# Patient Record
Sex: MALE | Race: WHITE | NOT HISPANIC OR LATINO | URBAN - METROPOLITAN AREA
[De-identification: names, ages, dates, MRNs, and addresses within clinical notes are randomized per-mention and may not be internally consistent; named-entity substitution may affect disease eponyms.]

---

## 2023-07-13 LAB
ALANINE AMINOTRANSFERASE (SGPT) (U/L) IN SER/PLAS: 19 U/L (ref 10–52)
ALBUMIN (G/DL) IN SER/PLAS: 4.5 G/DL (ref 3.4–5)
ALKALINE PHOSPHATASE (U/L) IN SER/PLAS: 40 U/L (ref 33–136)
ANION GAP IN SER/PLAS: 16 MMOL/L (ref 10–20)
ASPARTATE AMINOTRANSFERASE (SGOT) (U/L) IN SER/PLAS: 32 U/L (ref 9–39)
BASOPHILS (10*3/UL) IN BLOOD BY AUTOMATED COUNT: 0.03 X10E9/L (ref 0–0.1)
BASOPHILS/100 LEUKOCYTES IN BLOOD BY AUTOMATED COUNT: 0.5 % (ref 0–2)
BILIRUBIN TOTAL (MG/DL) IN SER/PLAS: 0.5 MG/DL (ref 0–1.2)
CALCIUM (MG/DL) IN SER/PLAS: 9.4 MG/DL (ref 8.6–10.3)
CARBON DIOXIDE, TOTAL (MMOL/L) IN SER/PLAS: 22 MMOL/L (ref 21–32)
CHLORIDE (MMOL/L) IN SER/PLAS: 104 MMOL/L (ref 98–107)
CHOLESTEROL (MG/DL) IN SER/PLAS: 172 MG/DL (ref 0–199)
CHOLESTEROL IN HDL (MG/DL) IN SER/PLAS: 44.5 MG/DL
CHOLESTEROL/HDL RATIO: 3.9
CREATININE (MG/DL) IN SER/PLAS: 1.13 MG/DL (ref 0.5–1.3)
EOSINOPHILS (10*3/UL) IN BLOOD BY AUTOMATED COUNT: 0.41 X10E9/L (ref 0–0.7)
EOSINOPHILS/100 LEUKOCYTES IN BLOOD BY AUTOMATED COUNT: 6.8 % (ref 0–6)
ERYTHROCYTE DISTRIBUTION WIDTH (RATIO) BY AUTOMATED COUNT: 12.8 % (ref 11.5–14.5)
ERYTHROCYTE MEAN CORPUSCULAR HEMOGLOBIN CONCENTRATION (G/DL) BY AUTOMATED: 31 G/DL (ref 32–36)
ERYTHROCYTE MEAN CORPUSCULAR VOLUME (FL) BY AUTOMATED COUNT: 93 FL (ref 80–100)
ERYTHROCYTES (10*6/UL) IN BLOOD BY AUTOMATED COUNT: 5.13 X10E12/L (ref 4.5–5.9)
GFR MALE: 72 ML/MIN/1.73M2
GLUCOSE (MG/DL) IN SER/PLAS: 90 MG/DL (ref 74–99)
HEMATOCRIT (%) IN BLOOD BY AUTOMATED COUNT: 47.8 % (ref 41–52)
HEMOGLOBIN (G/DL) IN BLOOD: 14.8 G/DL (ref 13.5–17.5)
IMMATURE GRANULOCYTES/100 LEUKOCYTES IN BLOOD BY AUTOMATED COUNT: 0.2 % (ref 0–0.9)
LDL: 105 MG/DL (ref 0–99)
LEUKOCYTES (10*3/UL) IN BLOOD BY AUTOMATED COUNT: 6.1 X10E9/L (ref 4.4–11.3)
LYMPHOCYTES (10*3/UL) IN BLOOD BY AUTOMATED COUNT: 1.89 X10E9/L (ref 1.2–4.8)
LYMPHOCYTES/100 LEUKOCYTES IN BLOOD BY AUTOMATED COUNT: 31.2 % (ref 13–44)
MONOCYTES (10*3/UL) IN BLOOD BY AUTOMATED COUNT: 0.5 X10E9/L (ref 0.1–1)
MONOCYTES/100 LEUKOCYTES IN BLOOD BY AUTOMATED COUNT: 8.3 % (ref 2–10)
NEUTROPHILS (10*3/UL) IN BLOOD BY AUTOMATED COUNT: 3.21 X10E9/L (ref 1.2–7.7)
NEUTROPHILS/100 LEUKOCYTES IN BLOOD BY AUTOMATED COUNT: 53 % (ref 40–80)
PLATELETS (10*3/UL) IN BLOOD AUTOMATED COUNT: 284 X10E9/L (ref 150–450)
POTASSIUM (MMOL/L) IN SER/PLAS: 4.2 MMOL/L (ref 3.5–5.3)
PROSTATE SPECIFIC AG (NG/ML) IN SER/PLAS: 1.6 NG/ML (ref 0–4)
PROTEIN TOTAL: 7.8 G/DL (ref 6.4–8.2)
SODIUM (MMOL/L) IN SER/PLAS: 138 MMOL/L (ref 136–145)
THYROTROPIN (MIU/L) IN SER/PLAS BY DETECTION LIMIT <= 0.05 MIU/L: 2.71 MIU/L (ref 0.44–3.98)
TRIGLYCERIDE (MG/DL) IN SER/PLAS: 111 MG/DL (ref 0–149)
UREA NITROGEN (MG/DL) IN SER/PLAS: 20 MG/DL (ref 6–23)
VLDL: 22 MG/DL (ref 0–40)

## 2024-06-27 ENCOUNTER — LAB (OUTPATIENT)
Dept: LAB | Facility: LAB | Age: 64
End: 2024-06-27
Payer: MEDICARE

## 2024-06-27 DIAGNOSIS — Z12.5 ENCOUNTER FOR SCREENING FOR MALIGNANT NEOPLASM OF PROSTATE: Primary | ICD-10-CM

## 2024-06-27 DIAGNOSIS — I10 ESSENTIAL (PRIMARY) HYPERTENSION: ICD-10-CM

## 2024-06-27 DIAGNOSIS — E78.2 MIXED HYPERLIPIDEMIA: ICD-10-CM

## 2024-06-27 LAB
ALBUMIN SERPL BCP-MCNC: 4.3 G/DL (ref 3.4–5)
ALP SERPL-CCNC: 44 U/L (ref 33–136)
ALT SERPL W P-5'-P-CCNC: 14 U/L (ref 10–52)
ANION GAP SERPL CALC-SCNC: 10 MMOL/L (ref 10–20)
AST SERPL W P-5'-P-CCNC: 20 U/L (ref 9–39)
BASOPHILS # BLD AUTO: 0.01 X10*3/UL (ref 0–0.1)
BASOPHILS NFR BLD AUTO: 0.2 %
BILIRUB SERPL-MCNC: 0.6 MG/DL (ref 0–1.2)
BUN SERPL-MCNC: 20 MG/DL (ref 6–23)
CALCIUM SERPL-MCNC: 8.9 MG/DL (ref 8.6–10.3)
CHLORIDE SERPL-SCNC: 105 MMOL/L (ref 98–107)
CHOLEST SERPL-MCNC: 162 MG/DL (ref 0–199)
CHOLESTEROL/HDL RATIO: 3.9
CO2 SERPL-SCNC: 28 MMOL/L (ref 21–32)
CREAT SERPL-MCNC: 1.12 MG/DL (ref 0.5–1.3)
EGFRCR SERPLBLD CKD-EPI 2021: 73 ML/MIN/1.73M*2
EOSINOPHIL # BLD AUTO: 0.18 X10*3/UL (ref 0–0.7)
EOSINOPHIL NFR BLD AUTO: 3.4 %
ERYTHROCYTE [DISTWIDTH] IN BLOOD BY AUTOMATED COUNT: 13.2 % (ref 11.5–14.5)
GLUCOSE SERPL-MCNC: 98 MG/DL (ref 74–99)
HCT VFR BLD AUTO: 44.6 % (ref 41–52)
HDLC SERPL-MCNC: 41.4 MG/DL
HGB BLD-MCNC: 14.1 G/DL (ref 13.5–17.5)
IMM GRANULOCYTES # BLD AUTO: 0.02 X10*3/UL (ref 0–0.7)
IMM GRANULOCYTES NFR BLD AUTO: 0.4 % (ref 0–0.9)
LDLC SERPL CALC-MCNC: 99 MG/DL
LYMPHOCYTES # BLD AUTO: 1.58 X10*3/UL (ref 1.2–4.8)
LYMPHOCYTES NFR BLD AUTO: 30.3 %
MCH RBC QN AUTO: 28.3 PG (ref 26–34)
MCHC RBC AUTO-ENTMCNC: 31.6 G/DL (ref 32–36)
MCV RBC AUTO: 89 FL (ref 80–100)
MONOCYTES # BLD AUTO: 0.62 X10*3/UL (ref 0.1–1)
MONOCYTES NFR BLD AUTO: 11.9 %
NEUTROPHILS # BLD AUTO: 2.81 X10*3/UL (ref 1.2–7.7)
NEUTROPHILS NFR BLD AUTO: 53.8 %
NON HDL CHOLESTEROL: 121 MG/DL (ref 0–149)
NRBC BLD-RTO: 0 /100 WBCS (ref 0–0)
PLATELET # BLD AUTO: 224 X10*3/UL (ref 150–450)
POTASSIUM SERPL-SCNC: 4.3 MMOL/L (ref 3.5–5.3)
PROT SERPL-MCNC: 7.1 G/DL (ref 6.4–8.2)
PSA SERPL-MCNC: 1.43 NG/ML
RBC # BLD AUTO: 4.99 X10*6/UL (ref 4.5–5.9)
SODIUM SERPL-SCNC: 139 MMOL/L (ref 136–145)
TRIGL SERPL-MCNC: 108 MG/DL (ref 0–149)
TSH SERPL-ACNC: 1.93 MIU/L (ref 0.44–3.98)
VLDL: 22 MG/DL (ref 0–40)
WBC # BLD AUTO: 5.2 X10*3/UL (ref 4.4–11.3)

## 2024-06-27 PROCEDURE — G0103 PSA SCREENING: HCPCS

## 2024-06-27 PROCEDURE — 80053 COMPREHEN METABOLIC PANEL: CPT

## 2024-06-27 PROCEDURE — 80061 LIPID PANEL: CPT

## 2024-06-27 PROCEDURE — 85025 COMPLETE CBC W/AUTO DIFF WBC: CPT

## 2024-06-27 PROCEDURE — 84443 ASSAY THYROID STIM HORMONE: CPT

## 2024-12-27 ENCOUNTER — APPOINTMENT (OUTPATIENT)
Dept: GENERAL RADIOLOGY | Age: 64
DRG: 310 | End: 2024-12-27
Payer: MEDICARE

## 2024-12-27 ENCOUNTER — HOSPITAL ENCOUNTER (INPATIENT)
Age: 64
LOS: 1 days | Discharge: HOME | DRG: 310 | End: 2024-12-28
Attending: STUDENT IN AN ORGANIZED HEALTH CARE EDUCATION/TRAINING PROGRAM | Admitting: INTERNAL MEDICINE
Payer: MEDICARE

## 2024-12-27 DIAGNOSIS — I48.91 ATRIAL FIBRILLATION WITH RVR (HCC): Primary | ICD-10-CM

## 2024-12-27 DIAGNOSIS — I48.19 PERSISTENT ATRIAL FIBRILLATION (HCC): ICD-10-CM

## 2024-12-27 LAB
EKG ATRIAL RATE: 147 BPM
EKG Q-T INTERVAL: 322 MS
EKG QRS DURATION: 102 MS
EKG QTC CALCULATION (BAZETT): 508 MS
EKG R AXIS: -41 DEGREES
EKG T AXIS: 6 DEGREES
EKG VENTRICULAR RATE: 150 BPM

## 2024-12-27 PROCEDURE — 96365 THER/PROPH/DIAG IV INF INIT: CPT

## 2024-12-27 PROCEDURE — 71045 X-RAY EXAM CHEST 1 VIEW: CPT

## 2024-12-27 PROCEDURE — 99285 EMERGENCY DEPT VISIT HI MDM: CPT

## 2024-12-27 PROCEDURE — 93005 ELECTROCARDIOGRAM TRACING: CPT

## 2024-12-27 RX ORDER — DILTIAZEM HYDROCHLORIDE 5 MG/ML
20 INJECTION INTRAVENOUS ONCE
Status: DISCONTINUED | OUTPATIENT
Start: 2024-12-27 | End: 2024-12-28

## 2024-12-27 ASSESSMENT — PAIN SCALES - GENERAL: PAINLEVEL_OUTOF10: 0

## 2024-12-27 ASSESSMENT — PAIN - FUNCTIONAL ASSESSMENT: PAIN_FUNCTIONAL_ASSESSMENT: 0-10

## 2024-12-28 ENCOUNTER — APPOINTMENT (OUTPATIENT)
Age: 64
DRG: 310 | End: 2024-12-28
Attending: INTERNAL MEDICINE
Payer: MEDICARE

## 2024-12-28 VITALS
OXYGEN SATURATION: 100 % | HEIGHT: 72 IN | SYSTOLIC BLOOD PRESSURE: 101 MMHG | HEART RATE: 91 BPM | DIASTOLIC BLOOD PRESSURE: 70 MMHG | BODY MASS INDEX: 30.34 KG/M2 | RESPIRATION RATE: 18 BRPM | TEMPERATURE: 97.7 F | WEIGHT: 224 LBS

## 2024-12-28 PROBLEM — I48.91 ATRIAL FIBRILLATION WITH RVR (HCC): Status: ACTIVE | Noted: 2024-12-28

## 2024-12-28 LAB
ANION GAP SERPL CALCULATED.3IONS-SCNC: 15 MEQ/L (ref 9–15)
BASOPHILS # BLD: 0 K/UL (ref 0–0.2)
BASOPHILS NFR BLD: 0.4 %
BNP BLD-MCNC: 116 PG/ML
BUN SERPL-MCNC: 17 MG/DL (ref 8–23)
CALCIUM SERPL-MCNC: 9.5 MG/DL (ref 8.5–9.9)
CHLORIDE SERPL-SCNC: 101 MEQ/L (ref 95–107)
CO2 SERPL-SCNC: 22 MEQ/L (ref 20–31)
CREAT SERPL-MCNC: 1.04 MG/DL (ref 0.7–1.2)
EKG ATRIAL RATE: 340 BPM
EKG Q-T INTERVAL: 388 MS
EKG QRS DURATION: 98 MS
EKG QTC CALCULATION (BAZETT): 442 MS
EKG R AXIS: -6 DEGREES
EKG T AXIS: 22 DEGREES
EKG VENTRICULAR RATE: 78 BPM
EOSINOPHIL # BLD: 0.2 K/UL (ref 0–0.7)
EOSINOPHIL NFR BLD: 2.2 %
ERYTHROCYTE [DISTWIDTH] IN BLOOD BY AUTOMATED COUNT: 12.8 % (ref 11.5–14.5)
GLUCOSE SERPL-MCNC: 154 MG/DL (ref 70–99)
HCT VFR BLD AUTO: 46.2 % (ref 42–52)
HGB BLD-MCNC: 15.5 G/DL (ref 14–18)
LYMPHOCYTES # BLD: 3.4 K/UL (ref 1–4.8)
LYMPHOCYTES NFR BLD: 34 %
MCH RBC QN AUTO: 29.1 PG (ref 27–31.3)
MCHC RBC AUTO-ENTMCNC: 33.5 % (ref 33–37)
MCV RBC AUTO: 86.7 FL (ref 79–92.2)
MONOCYTES # BLD: 0.9 K/UL (ref 0.2–0.8)
MONOCYTES NFR BLD: 8.5 %
NEUTROPHILS # BLD: 5.5 K/UL (ref 1.4–6.5)
NEUTS SEG NFR BLD: 54.6 %
PLATELET # BLD AUTO: 373 K/UL (ref 130–400)
POTASSIUM SERPL-SCNC: 3.6 MEQ/L (ref 3.4–4.9)
RBC # BLD AUTO: 5.33 M/UL (ref 4.7–6.1)
SODIUM SERPL-SCNC: 138 MEQ/L (ref 135–144)
T4 FREE SERPL-MCNC: 1.15 NG/DL (ref 0.84–1.68)
TROPONIN, HIGH SENSITIVITY: 10 NG/L (ref 0–19)
TROPONIN, HIGH SENSITIVITY: 14 NG/L (ref 0–19)
TSH SERPL-MCNC: 2.84 UIU/ML (ref 0.44–3.86)
WBC # BLD AUTO: 10.1 K/UL (ref 4.8–10.8)

## 2024-12-28 PROCEDURE — 85025 COMPLETE CBC W/AUTO DIFF WBC: CPT

## 2024-12-28 PROCEDURE — 99235 HOSP IP/OBS SAME DATE MOD 70: CPT | Performed by: INTERNAL MEDICINE

## 2024-12-28 PROCEDURE — 2500000003 HC RX 250 WO HCPCS: Performed by: STUDENT IN AN ORGANIZED HEALTH CARE EDUCATION/TRAINING PROGRAM

## 2024-12-28 PROCEDURE — 96365 THER/PROPH/DIAG IV INF INIT: CPT

## 2024-12-28 PROCEDURE — 84443 ASSAY THYROID STIM HORMONE: CPT

## 2024-12-28 PROCEDURE — 84484 ASSAY OF TROPONIN QUANT: CPT

## 2024-12-28 PROCEDURE — 84439 ASSAY OF FREE THYROXINE: CPT

## 2024-12-28 PROCEDURE — 2060000000 HC ICU INTERMEDIATE R&B

## 2024-12-28 PROCEDURE — 6370000000 HC RX 637 (ALT 250 FOR IP): Performed by: INTERNAL MEDICINE

## 2024-12-28 PROCEDURE — C8929 TTE W OR WO FOL WCON,DOPPLER: HCPCS

## 2024-12-28 PROCEDURE — 83880 ASSAY OF NATRIURETIC PEPTIDE: CPT

## 2024-12-28 PROCEDURE — 2580000003 HC RX 258: Performed by: STUDENT IN AN ORGANIZED HEALTH CARE EDUCATION/TRAINING PROGRAM

## 2024-12-28 PROCEDURE — 96375 TX/PRO/DX INJ NEW DRUG ADDON: CPT

## 2024-12-28 PROCEDURE — 80048 BASIC METABOLIC PNL TOTAL CA: CPT

## 2024-12-28 PROCEDURE — 6370000000 HC RX 637 (ALT 250 FOR IP): Performed by: STUDENT IN AN ORGANIZED HEALTH CARE EDUCATION/TRAINING PROGRAM

## 2024-12-28 PROCEDURE — 93005 ELECTROCARDIOGRAM TRACING: CPT

## 2024-12-28 RX ORDER — METOPROLOL TARTRATE 25 MG/1
25 TABLET, FILM COATED ORAL 2 TIMES DAILY
Status: DISCONTINUED | OUTPATIENT
Start: 2024-12-28 | End: 2024-12-28 | Stop reason: HOSPADM

## 2024-12-28 RX ORDER — METOPROLOL SUCCINATE 25 MG/1
25 TABLET, EXTENDED RELEASE ORAL DAILY
Status: DISCONTINUED | OUTPATIENT
Start: 2024-12-28 | End: 2024-12-28

## 2024-12-28 RX ORDER — SODIUM CHLORIDE 9 MG/ML
INJECTION, SOLUTION INTRAVENOUS PRN
Status: DISCONTINUED | OUTPATIENT
Start: 2024-12-28 | End: 2024-12-28 | Stop reason: HOSPADM

## 2024-12-28 RX ORDER — ONDANSETRON 4 MG/1
4 TABLET, ORALLY DISINTEGRATING ORAL EVERY 8 HOURS PRN
Status: DISCONTINUED | OUTPATIENT
Start: 2024-12-28 | End: 2024-12-28 | Stop reason: HOSPADM

## 2024-12-28 RX ORDER — ENOXAPARIN SODIUM 100 MG/ML
40 INJECTION SUBCUTANEOUS DAILY
Status: DISCONTINUED | OUTPATIENT
Start: 2024-12-28 | End: 2024-12-28

## 2024-12-28 RX ORDER — SODIUM CHLORIDE 0.9 % (FLUSH) 0.9 %
5-40 SYRINGE (ML) INJECTION PRN
Status: DISCONTINUED | OUTPATIENT
Start: 2024-12-28 | End: 2024-12-28 | Stop reason: HOSPADM

## 2024-12-28 RX ORDER — ACETAMINOPHEN 325 MG/1
650 TABLET ORAL EVERY 4 HOURS PRN
Status: DISCONTINUED | OUTPATIENT
Start: 2024-12-28 | End: 2024-12-28 | Stop reason: HOSPADM

## 2024-12-28 RX ORDER — FAMOTIDINE 20 MG/1
20 TABLET, FILM COATED ORAL ONCE
Status: COMPLETED | OUTPATIENT
Start: 2024-12-28 | End: 2024-12-28

## 2024-12-28 RX ORDER — SODIUM CHLORIDE 0.9 % (FLUSH) 0.9 %
5-40 SYRINGE (ML) INJECTION EVERY 12 HOURS SCHEDULED
Status: DISCONTINUED | OUTPATIENT
Start: 2024-12-28 | End: 2024-12-28 | Stop reason: HOSPADM

## 2024-12-28 RX ORDER — METOPROLOL TARTRATE 25 MG/1
25 TABLET, FILM COATED ORAL 2 TIMES DAILY
Qty: 60 TABLET | Refills: 3 | Status: SHIPPED | OUTPATIENT
Start: 2024-12-28 | End: 2024-12-28

## 2024-12-28 RX ORDER — METOPROLOL TARTRATE 1 MG/ML
5 INJECTION, SOLUTION INTRAVENOUS EVERY 5 MIN PRN
Status: COMPLETED | OUTPATIENT
Start: 2024-12-28 | End: 2024-12-28

## 2024-12-28 RX ORDER — ESOMEPRAZOLE MAGNESIUM 20 MG/1
20 GRANULE, DELAYED RELEASE ORAL PRN
COMMUNITY

## 2024-12-28 RX ORDER — ONDANSETRON 2 MG/ML
4 INJECTION INTRAMUSCULAR; INTRAVENOUS EVERY 6 HOURS PRN
Status: DISCONTINUED | OUTPATIENT
Start: 2024-12-28 | End: 2024-12-28 | Stop reason: HOSPADM

## 2024-12-28 RX ORDER — METOPROLOL TARTRATE 25 MG/1
25 TABLET, FILM COATED ORAL 2 TIMES DAILY
Qty: 60 TABLET | Refills: 3 | Status: SHIPPED | OUTPATIENT
Start: 2024-12-28

## 2024-12-28 RX ORDER — DILTIAZEM HYDROCHLORIDE 5 MG/ML
20 INJECTION INTRAVENOUS ONCE
Status: COMPLETED | OUTPATIENT
Start: 2024-12-28 | End: 2024-12-28

## 2024-12-28 RX ADMIN — METOPROLOL TARTRATE 25 MG: 25 TABLET, FILM COATED ORAL at 11:28

## 2024-12-28 RX ADMIN — METOPROLOL TARTRATE 5 MG: 5 INJECTION INTRAVENOUS at 00:09

## 2024-12-28 RX ADMIN — METOPROLOL TARTRATE 5 MG: 5 INJECTION INTRAVENOUS at 00:04

## 2024-12-28 RX ADMIN — SODIUM CHLORIDE 5 MG/HR: 900 INJECTION, SOLUTION INTRAVENOUS at 01:11

## 2024-12-28 RX ADMIN — DILTIAZEM HYDROCHLORIDE 20 MG: 5 INJECTION, SOLUTION INTRAVENOUS at 00:58

## 2024-12-28 RX ADMIN — FAMOTIDINE 20 MG: 20 TABLET, FILM COATED ORAL at 00:58

## 2024-12-28 RX ADMIN — METOPROLOL TARTRATE 5 MG: 5 INJECTION INTRAVENOUS at 00:14

## 2024-12-28 ASSESSMENT — LIFESTYLE VARIABLES
HOW OFTEN DO YOU HAVE A DRINK CONTAINING ALCOHOL: NEVER
HOW OFTEN DO YOU HAVE A DRINK CONTAINING ALCOHOL: NEVER
HOW MANY STANDARD DRINKS CONTAINING ALCOHOL DO YOU HAVE ON A TYPICAL DAY: PATIENT DOES NOT DRINK
HOW MANY STANDARD DRINKS CONTAINING ALCOHOL DO YOU HAVE ON A TYPICAL DAY: PATIENT DOES NOT DRINK

## 2024-12-28 ASSESSMENT — ENCOUNTER SYMPTOMS
VOMITING: 0
GASTROINTESTINAL NEGATIVE: 1
EYES NEGATIVE: 1
ABDOMINAL PAIN: 0
NAUSEA: 0
ALLERGIC/IMMUNOLOGIC NEGATIVE: 1
WHEEZING: 0
SHORTNESS OF BREATH: 0

## 2024-12-28 ASSESSMENT — PAIN SCALES - GENERAL: PAINLEVEL_OUTOF10: 0

## 2024-12-28 NOTE — ED TRIAGE NOTES
Pt arived with co AFIB.   Pt states he took 12.5 mg lopressor and xanax before coming because he was nervous.

## 2024-12-28 NOTE — ED PROVIDER NOTES
[facility] ED  Emergency Department Encounter  Emergency Medicine      Pt Name: Dustin Yee  MRN:[id number]  Birthdate 1960  Date of evaluation: 12/27/24  Time: 11:50 PM EST  PCP:  Unknown, Unknown (Inactive)    CHIEF COMPLAINT       Chief Complaint   Patient presents with    Tachycardia       HISTORY OF PRESENT ILLNESS  (Location/Symptom, Timing/Onset, Context/Setting, Quality, Duration, ModifyingFactors, Severity.)      Dustin Yee is a 64 y.o. male with PMH of hypertension, anxiety, paroxysmal A-fib presents with tachycardia.  Said he ate dinner and felt like it started to come on a few hours ago, had some left-sided chest pain and palpitations.  Denies lightheadedness no current chest pain not short of breath no leg swelling.  He is on any blood thinners.  He said he had 2 other episodes of A-fib when he was 34 and 49 years old.  He did have to get admitted at 1 point but converted after getting on medications.  He did take his Lopressor 12.5 mg this morning and this afternoon.  He recently had upper respiratory symptoms of congestion and cough but has been getting better over the last several days.  Denies any hemoptysis no nausea vomiting no diarrhea no urinary complaints no drugs or alcohol    PAST MEDICAL / SURGICAL / SOCIAL /FAMILY HISTORY      has no past medical history on file.  No other pertinent PMH on review with patient/guardian.     has no past surgical history on file.  No other pertinent PSH on review with patient/guardian.  Social History     Socioeconomic History    Marital status:      Spouse name: Not on file    Number of children: Not on file    Years of education: Not on file    Highest education level: Not on file   Occupational History    Not on file   Tobacco Use    Smoking status: Never    Smokeless tobacco: Never   Substance and Sexual Activity    Alcohol use: Never    Drug use: Never    Sexual activity: Not on file   Other Topics Concern    Not on file

## 2024-12-28 NOTE — PLAN OF CARE
Problem: Discharge Planning  Goal: Discharge to home or other facility with appropriate resources  Outcome: Progressing  Flowsheets  Taken 12/28/2024 0324  Discharge to home or other facility with appropriate resources:   Identify barriers to discharge with patient and caregiver   Identify discharge learning needs (meds, wound care, etc)   Arrange for interpreters to assist at discharge as needed   Refer to discharge planning if patient needs post-hospital services based on physician order or complex needs related to functional status, cognitive ability or social support system   Arrange for needed discharge resources and transportation as appropriate  Taken 12/28/2024 0323  Discharge to home or other facility with appropriate resources:   Identify barriers to discharge with patient and caregiver   Identify discharge learning needs (meds, wound care, etc)   Refer to discharge planning if patient needs post-hospital services based on physician order or complex needs related to functional status, cognitive ability or social support system   Arrange for interpreters to assist at discharge as needed   Arrange for needed discharge resources and transportation as appropriate     Problem: Pain  Goal: Verbalizes/displays adequate comfort level or baseline comfort level  Outcome: Progressing

## 2024-12-28 NOTE — H&P
Transportation (Medical): No     Lack of Transportation (Non-Medical): No   Physical Activity: Sufficiently Active (7/1/2024)    Received from [facility]    Exercise Vital Sign     Days of Exercise per Week: 4 days     Minutes of Exercise per Session: 100 min   Stress: No Stress Concern Present (7/1/2024)    Received from [facility]    [employer] [place] [employer] - Occupational Stress Questionnaire     Feeling of Stress : Not at all   Housing Stability: Low Risk  (12/28/2024)    Housing Stability Vital Sign     Unable to Pay for Housing in the Last Year: No     Number of Times Moved in the Last Year: 1     Homeless in the Last Year: No       History reviewed. No pertinent family history.    Current Facility-Administered Medications   Medication Dose Route Frequency Provider Last Rate Last Admin    dilTIAZem 100 mg in sodium chloride 0.9 % 100 mL infusion ([facility])  2.5-15 mg/hr IntraVENous Continuous Agustín Strong DO 2.5 mL/hr at 12/28/24 0914 2.5 mg/hr at 12/28/24 0914    sodium chloride flush 0.9 % injection 5-40 mL  5-40 mL IntraVENous 2 times per day Agustín Strong DO        sodium chloride flush 0.9 % injection 5-40 mL  5-40 mL IntraVENous PRN Agustín Strong DO        0.9 % sodium chloride infusion   IntraVENous PRN Agustín Strong DO        acetaminophen (TYLENOL) tablet 650 mg  650 mg Oral Q4H PRN Agustín Strong DO        ondansetron (ZOFRAN-ODT) disintegrating tablet 4 mg  4 mg Oral Q8H PRN Agustín Strong DO        Or    ondansetron (ZOFRAN) injection 4 mg  4 mg IntraVENous Q6H PRN Agustín Strong,         enoxaparin (LOVENOX) injection 40 mg  40 mg SubCUTAneous Daily Agustín Strong DO           ALLERGIES: Caffeine and Codeine    Review of Systems   Constitutional: Negative.  Negative for chills and fever.   HENT: Negative.     Eyes: Negative.    Respiratory:  Negative for shortness of breath and wheezing.    Cardiovascular:  Positive for palpitations. Negative for chest  awaiting discharge, no change nerve deficit.   Psychiatric:         Behavior: Behavior normal.         Thought Content: Thought content normal.         Judgment: Judgment normal.         LABS:    Pertinent Labs:  CBC:   Recent Labs     12/28/24 0008   WBC 10.1   HGB 15.5        BMP:  Recent Labs     12/28/24 0008      K 3.6      CO2 22   BUN 17   CREATININE 1.04   GLUCOSE 154*   LABGLOM 79.1     INR: No results for input(s): \"INR\" in the last 72 hours.  BNP: No results for input(s): \"BNP\" in the last 72 hours.   TSH:   Lab Results   Component Value Date    TSH 2.840 12/28/2024      Cardiac Injury Profile: No results for input(s): \"CKTOTAL\", \"CKMB\", \"CKMBINDEX\", \"TROPONINI\" in the last 72 hours.   Troponin:   Lab Results   Component Value Date/Time    TROPONINI 0.031 09/19/2011 05:55 AM     Lipid Profile:   Lab Results   Component Value Date/Time    TRIG 228 09/18/2011 06:05 PM    HDL 41 09/18/2011 06:05 PM    CHOL 197 09/18/2011 06:05 PM      Hemoglobin A1C: No components found for: \"HGBA1C\"       Radiology:  XR CHEST PORTABLE    Result Date: 12/28/2024  EXAMINATION: ONE XRAY VIEW OF THE CHEST 12/28/2024 12:09 am COMPARISON: 9-2011 HISTORY: ORDERING SYSTEM PROVIDED HISTORY: tachycardia TECHNOLOGIST PROVIDED HISTORY: Reason for exam:->tachycardia What reading provider will be dictating this exam?->CRC FINDINGS: Cardiomediastinal silhouette: No cardiomegaly or obvious acute process is identified. Lungs/pleura: No acute pulmonary infiltrate, pleural effusion, or pneumothorax is identified. Other: No additional acute abnormality.     No acute cardiopulmonary process is identified by portable chest x-ray.       EKG: atrial fibrillation      2D Echo:     No results found for this or any previous visit.      Stress Test:     No results found for this or any previous visit.  No results found for this or any previous visit from the past 365 days.       No results found for this or any previous visit from the past 365

## 2024-12-29 LAB
ECHO AO ROOT DIAM: 3.7 CM
ECHO AO ROOT INDEX: 1.64 CM/M2
ECHO AV AREA PEAK VELOCITY: 2.9 CM2
ECHO AV AREA VTI: 3.2 CM2
ECHO AV AREA/BSA PEAK VELOCITY: 1.3 CM2/M2
ECHO AV AREA/BSA VTI: 1.4 CM2/M2
ECHO AV MEAN GRADIENT: 5 MMHG
ECHO AV MEAN VELOCITY: 1 M/S
ECHO AV PEAK GRADIENT: 9 MMHG
ECHO AV PEAK VELOCITY: 1.7 M/S
ECHO AV VELOCITY RATIO: 0.71
ECHO AV VTI: 28 CM
ECHO BSA: 2.28 M2
ECHO EST RA PRESSURE: 3 MMHG
ECHO LA DIAMETER INDEX: 2.13 CM/M2
ECHO LA DIAMETER: 4.8 CM
ECHO LA TO AORTIC ROOT RATIO: 1.3
ECHO LA VOL A-L A4C: 65 ML (ref 18–58)
ECHO LA VOL MOD A4C: 61 ML (ref 18–58)
ECHO LA VOLUME INDEX A-L A4C: 29 ML/M2 (ref 16–34)
ECHO LA VOLUME INDEX MOD A4C: 27 ML/M2 (ref 16–34)
ECHO LV E' LATERAL VELOCITY: 15.7 CM/S
ECHO LV E' SEPTAL VELOCITY: 10.09 CM/S
ECHO LV EDV A4C: 53 ML
ECHO LV EDV INDEX A4C: 24 ML/M2
ECHO LV EF PHYSICIAN: 60 %
ECHO LV EJECTION FRACTION A4C: 77 %
ECHO LV ESV A4C: 12 ML
ECHO LV ESV INDEX A4C: 5 ML/M2
ECHO LV FRACTIONAL SHORTENING: 29 % (ref 28–44)
ECHO LV INTERNAL DIMENSION DIASTOLE INDEX: 2.13 CM/M2
ECHO LV INTERNAL DIMENSION DIASTOLIC: 4.8 CM (ref 4.2–5.9)
ECHO LV INTERNAL DIMENSION SYSTOLIC INDEX: 1.51 CM/M2
ECHO LV INTERNAL DIMENSION SYSTOLIC: 3.4 CM
ECHO LV IVSD: 1.2 CM (ref 0.6–1)
ECHO LV IVSS: 1.6 CM
ECHO LV MASS 2D: 232.2 G (ref 88–224)
ECHO LV MASS INDEX 2D: 103.2 G/M2 (ref 49–115)
ECHO LV POSTERIOR WALL DIASTOLIC: 1.3 CM (ref 0.6–1)
ECHO LV POSTERIOR WALL SYSTOLIC: 1.8 CM
ECHO LV RELATIVE WALL THICKNESS RATIO: 0.54
ECHO LVOT AREA: 3.8 CM2
ECHO LVOT AV VTI INDEX: 0.83
ECHO LVOT DIAM: 2.2 CM
ECHO LVOT MEAN GRADIENT: 3 MMHG
ECHO LVOT PEAK GRADIENT: 6 MMHG
ECHO LVOT PEAK VELOCITY: 1.2 M/S
ECHO LVOT STROKE VOLUME INDEX: 39 ML/M2
ECHO LVOT SV: 87.8 ML
ECHO LVOT VTI: 23.1 CM
ECHO MV A VELOCITY: 0.34 M/S
ECHO MV E DECELERATION TIME (DT): 201.5 MS
ECHO MV E VELOCITY: 0.81 M/S
ECHO MV E/A RATIO: 2.38
ECHO MV E/E' LATERAL: 5.16
ECHO MV E/E' RATIO (AVERAGED): 6.59
ECHO MV E/E' SEPTAL: 8.03
ECHO PV MAX VELOCITY: 1 M/S
ECHO PV PEAK GRADIENT: 4 MMHG
ECHO RIGHT VENTRICULAR SYSTOLIC PRESSURE (RVSP): 9 MMHG
ECHO RV INTERNAL DIMENSION: 3.1 CM
ECHO RV TAPSE: 2 CM (ref 1.7–?)
ECHO TV REGURGITANT MAX VELOCITY: 1.19 M/S
ECHO TV REGURGITANT PEAK GRADIENT: 6 MMHG

## 2024-12-29 PROCEDURE — 93306 TTE W/DOPPLER COMPLETE: CPT | Performed by: INTERNAL MEDICINE

## 2025-01-02 LAB
EKG ATRIAL RATE: 147 BPM
EKG ATRIAL RATE: 340 BPM
EKG Q-T INTERVAL: 322 MS
EKG Q-T INTERVAL: 388 MS
EKG QRS DURATION: 102 MS
EKG QRS DURATION: 98 MS
EKG QTC CALCULATION (BAZETT): 442 MS
EKG QTC CALCULATION (BAZETT): 508 MS
EKG R AXIS: -41 DEGREES
EKG R AXIS: -6 DEGREES
EKG T AXIS: 22 DEGREES
EKG T AXIS: 6 DEGREES
EKG VENTRICULAR RATE: 150 BPM
EKG VENTRICULAR RATE: 78 BPM

## 2025-06-03 PROBLEM — R73.03 PREDIABETES: Status: ACTIVE | Noted: 2025-06-03

## 2025-06-03 NOTE — PROGRESS NOTES
Nutrition Initial Assessment:     Patient Dm Marr is a 64 y.o. male being seen at [facility] who was referred by CHRIS De on 5/21/25 for   1. Prediabetes  Referral to Diabetes Education        Nutrition Assessment    Problem List[1]    Nutrition History:  Food & Nutrition History: Patient reports his A1C has been 5.7-5.9% for a few years, A1C of 5.8% is in his chart 9/2021. Recently his A1C was 6.5%. He was on a higher dose of metoprolol and has considered that this might have led to an increase in A1C - the dose of metoprolol is now back to a lower level. When it was high he was less physically active because he was experiencing dizziness. He has cut back on sugar intake since learning of elevated A1C. His wife Bouchra came to the visit as well, they are interested in learning strategies for promoting glycemic control. He has been targeting 20-40 g CHO/meal and 15-20 g CHO/snack. He has a glucometer at home, has checked 5 times fasting in the past ~1 month and values have been  mg/dL. Has been considering taking GlycoShield since he heard it could help.  Food Allergies:  (none)    Diet Recall:  Meal 1: B: 8, cream of wheat w/ bluberries and cinnamon, sometimes w/ 1 slice of avocado toast and a tangerine. Or sugar-free plain yogurt w/ blueberries & strawberries in it. On the weekend, eggs w/ cheese and 2.5 half piece of fallon w/ 2 slices avocado toast  Meal 2: L: 1-2, low carb wrap containing homemade leftover shredded chicken or homemade tuna salad. Side of raw veggies (celery, brocooli, cucumber, cauliflower) w/ ranch dressing.  Snack 2: Swiss cheese w/ avocado on crackers (15 grams carbohydrate)  Meal 3: D: chicken breast, and 1 time per week red meat (steak or ground beef). Salad (cucumber, cheese, tomato), and an orange. Or steamed/cooked veggies (brussels sprouts, carrots, mushrooms, tomatoes). Small baked potato or yam or 1/2 large.  Snack 3: veggie straws.  Fluid  Intake: water, was drinking 1 pop per night but stopped it. Used to drink decaf tea w/ honey - stopped it.  Energy Intake: Good > 75 %    Food Preparation:  Cooking: Patient, Spouse/Significant Other  Grocery Shopping: Patient, Spouse/Significant Other    Physical Activity:   He is a physically active person. He works out 2-3 days per week, and also walks 3 miles in 40 minutes daily. He also is active around the house such as yardwork.    Anthropometrics:  Height: 182.9 cm (6')   Weight: 91.6 kg (201 lb 14.4 oz) (with shoes in-office)   BMI (Calculated): 27.38                 Weight History:   Daily Weight  06/04/25 : 91.6 kg (201 lb 14.4 oz)  07/25/23 : 90.7 kg (200 lb)    Weight Change %:  Weight History / % Weight Change: about 212# - he has lost 10# / 4.7% recently since changing his diet    Nutrition Focused Physical Exam Findings:  Visual assessment    Subcutaneous Fat Loss:   Orbital Fat Pads: Well nourished (slightly bulging fat pads)  Buccal Fat Pads: Well nourished (full, rounded cheeks)  Triceps: Well nourished (ample fat tissue)  Ribs: Defer    Muscle Wasting:  Temporalis: Well nourished (well-defined muscle)  Pectoralis (Clavicular Region): Well nourished (clavicle not visible)  Deltoid/Trapezius: Well nourished (rounded appearance at arm, shoulder, neck)  Interosseous: Well nourished (muscle bulges)  Trapezius/Infraspinatus/Supraspinatus (Scapular Region): Defer  Quadriceps: Well nourished (well developed, well rounded)  Gastrocnemius: Well nourished (well developed bulbous muscle)    Nutrition Significant Labs:  CBC Trend:   Recent Labs     06/27/24  0908 07/13/23  0850   WBC 5.2 6.1   NRBC 0.0  --    RBC 4.99 5.13   HGB 14.1 14.8   HCT 44.6 47.8   MCV 89 93   MCH 28.3  --    MCHC 31.6* 31.0*   RDW 13.2 12.8    284   , RFP + Serum Mag Trend:   Recent Labs     06/27/24  0908 07/13/23  0850   GLUCOSE 98 90    138   K 4.3 4.2    104   CO2 28 22   ANIONGAP 10 16   BUN 20 20   CREATININE  "1.12 1.13   EGFR 73  --    CALCIUM 8.9 9.4   ALBUMIN 4.3 4.5   , LFT Trend:   Recent Labs     06/27/24  0908 07/13/23  0850   ALBUMIN 4.3 4.5   BILITOT 0.6 0.5   ALKPHOS 44 40   ALT 14 19   AST 20 32   PROT 7.1 7.8   , DM Specific Labs Trend (Includes HgbA1C, antibodies & fasting insulin): No results for input(s): \"HGBA1C\", \"CPEPTIDE\", \"INSULFAST\" in the last 43192 hours.    No lab exists for component: \"BHDRXBUT\", \"PAS4NXO\", Lipid Panel Trend:    Recent Labs     06/27/24  0908 07/13/23  0850   CHOL 162 172   HDL 41.4 44.5   LDLCALC 99  --    LDLF  --  105*   VLDL 22 22   TRIG 108 111   , Iron Panel + Serum Ferritin Trend: No results for input(s): \"IRON\", \"UIBC\", \"TIBC\", \"IRONSAT\", \"FERRITIN\" in the last 89153 hours., Vitamin B12: No results found for: \"XTTQSFLT21\" , Folate: No results found for: \"FOLATE\" , and Vitamin D: No results found for: \"VITD25\"     Medications:  He reports he takes 25 mg metoprolol and Tricor    Nutrition Diagnosis   Malnutrition Diagnosis  Patient has Malnutrition Diagnosis: No    Nutrition Diagnosis  Patient has Nutrition Diagnosis: Yes  Diagnosis Status (1): New  Nutrition Diagnosis 1: Altered nutrition related to laboratory values  Related to (1): endocrine dysfunction  As Evidenced by (1): A1C 6.5%       Nutrition Interventions/Recommendations   Nutrition Prescription: Oral nutrition Reduced carbohydrate/carbohydrate controlled diet    Nutrition Interventions:   Food and Nutrient Delivery: Meals & Snacks: Carbohydrate-modified diet  Goals: 1. Use the Plate Method to balance types/amount of foods at meals 2. Continue to avoid sugar-containing beverages 3. Target intake of 30-60 g Carbohydrate per meal (about 45 at many meals) and 15-30 g carbohydrate per snack  Other:: Physical Activity  Goals: 4. Keep up the regular exercise habit of working out 2-3 days per week, walking daily and being active in other ways as tolerated       Nutrition Education:   Nutrition Education Content: Content " related nutrition education  types/amount of carbohydrate containing foods to include at meals/snacks, including adequate protein and vegetables at meals, hydration idea (True Lemon)    Educational Handouts Provided: ADA Plate Method, ADA BG targets    Readiness to Change : Good  Level of Understanding : Good  Anticipated Compliant : Good         Nutrition Monitoring and Evaluation   Food and Nutrient Intake  Monitoring and Evaluation Plan: Meal/snack pattern, Carbohydrate intake  Meal/Snack Pattern: Estimated meal and snack pattern  Criteria: 3/day  Estimated carbohydrate intake: Estimated carbohydrate intake  Criteria: 30-60 g/meal, 15-30 g/snack    Knowledge Belief Attitude Determination   Monitoring and Evaluation Plan: Physical activity  Physical activity: Consistency  Criteria: 150+ minutes/week    Anthropometric measurements  Monitoring and Evaluation Plan: Weight    Biochemical Data, Medical Tests and Procedures  Monitoring and Evaluation Plan: Glucose/endocrine profile                Follow Up: PRN    Time Spent  Prep time on day of patient encounter: 5 minutes  Time spent directly with patient, family or caregiver: 60 minutes  Additional Time Spent on Patient Care Activities: 0 minutes  Documentation Time: 10 minutes  Other Time Spent: 0 minutes  Total: 75 minutes             [1]   Patient Active Problem List  Diagnosis    Prediabetes

## 2025-06-04 ENCOUNTER — APPOINTMENT (OUTPATIENT)
Dept: PRIMARY CARE | Facility: CLINIC | Age: 65
End: 2025-06-04
Payer: MEDICARE

## 2025-06-04 VITALS — WEIGHT: 201.9 LBS | BODY MASS INDEX: 27.35 KG/M2 | HEIGHT: 72 IN

## 2025-06-04 DIAGNOSIS — R73.03 PREDIABETES: Primary | ICD-10-CM

## 2025-06-04 PROCEDURE — 97802 MEDICAL NUTRITION INDIV IN: CPT | Performed by: DIETITIAN, REGISTERED

## 2025-06-04 NOTE — PATIENT INSTRUCTIONS
Explained dietary and lifestyle strategies that can help decrease risk of diabetes.   - Spread food throughout the day. Continue to eat 3 meals/day.   - At meals, use the Plate Method to help balance nutrients at the meal. The Plate Method limits the amount of carbohydrate on the plate, which are the foods with sugar and starch that can make blood sugars rise. Don't eliminate carbohydrates entirely, because they are nourishing foods that can add fiber, vitamins, minerals and energy to the diet. Best choices for carbohydrate foods are whole grains, lower-sugar yogurt, fruit (especially berries and melon), beans, lentils, and starchy vegetables like butternut squash, corn, peas, and potato. These can be used instead of more refined carbohydrates like white/refined grains, fruit juice, sweets, baked goods and sugar-sweetened beverages. Including an abundance of vegetables on one half of the plate helps to make the plate high in fiber which can help with feeling full and making carbohydrate foods digest slower. Including 1/4 plate protein food can also help with fullness.   - Discussed that physical activity has a meaningful impact on blood sugar control. Talked about physical activity in daily routine and also exercise.   - Losing 7-10% of body weight is a target set by the American Diabetes Association for helping prevent diabetes. Talked about how making sustainable changes in diet and lifestyle is at the core of diabetes prevention, and that blood sugars can be improved even without weight loss, or before weight loss occurs.  He's already lost about 5% of his body weight, if he maintains this loss or perhaps loses another 5 pounds this would be an excellent target.     2. Discussed hydration options  - since he prefers to avoid artificial/non-nutritive sweeteners, suggested adding True Lemon packets to water.     3. Discussed herbal/alternative therapies for glycemic control. Advised that cinnamon or other herbal  supplements likely won't hurt but also don't have therapeutic benefit as strong as medications. Encouraged him to continue seasoning foods with cinnamon to promote flavor without adding sugars (such as in his cream of wheat).     4. Reviewed targets for glycemic control:  Fasting for someone with diabetes:  mg/dL. More tight glycemic control would be a target of 100 mg/dL or lower  2 hour post-meal for someone with diabetes: 180 mg/dL or less. More tight glycemic control would be a target of 120-140 mg/dL.   Told him about Stelo and Lingo as OTC options for continuous glucose monitoring which can provide more insights into the way his body responds to foods at various times of the day, or how it responds to other variables such as exercise.